# Patient Record
(demographics unavailable — no encounter records)

---

## 2025-02-18 NOTE — REASON FOR VISIT
[Home] : at home, [unfilled] , at the time of the visit. [Medical Office: ([facility])___] : at the medical office located in  [Telehealth (audio & video)] : This visit was provided via telehealth using real-time 2-way audio visual technology. [Initial Evaluation] : an initial evaluation [FreeTextEntry1] : Recurrent left inguinal hernia after a robot-assisted laparoscopic left inguinal hernia repair done in October 2024.  Patient appreciates radiation from left groin to left testicle.  Without any much relief.  When he sits with pants with a belt he is uncomfortable with that.  He is considering a second opinion here for a tissue repair with possible removal of mesh.  There is a reported recurrence based on an ultrasound and a CAT scan image.  He will share those with me.

## 2025-02-18 NOTE — PLAN
[FreeTextEntry1] : Recurrent left inguinal hernia based on imaging and expert consultation from his prior general surgeon.  Chronic left groin pain after robot-assisted laparoscopic left inguinal hernia repair October 2024.  Patient has been counseled on exploration of the left groin, explantation of left inguinal mesh, and open left inguinal hernia repair Shouldice technique.   Surgical options including watchful waiting have been discussed with patient at length.  I reported  that the data shows that watchful waiting may actually eventually convert over [id number] 80% of patients having a surgical intervention.  The best time for surgery is usually during a window of good health.  Surgical options include open, no mesh, with mesh, minimally invasive - meaning laparoscopic or robotic with mesh.  Detailed explanations and a discussion was undertaken with the patient.  Questions have been answered to their satisfaction. Risks, benefits and contraindications were discussed with him at length, including, but not exclusive to bleeding, infection, nerve/vessel injury, surgically transecting the cremasterics including the genital branch of the gentofemoral nerve  which runs in the cremasterics, recurrance rates, chronic pain, sutures used, heart attach, stroke, blood clots, more surgery, death Preoperative labs will be ordered, including cbc, cmp, ekg, in addition to clearance from primary and other specialists as needed.  Preparations and expectations reviewed for preop, perioperative and postoperative discussed in detail, including, pain management, showering, no swimming or bathing for 10 days, follow up in my office in 2-4 weeks, diet recommendations, activity recommendations and return to work.

## 2025-04-15 NOTE — PLAN
[FreeTextEntry1] : s/p open bilaterel inguial herina repair - repair structurally sound. revieewed op note, ansewrwed recovery questions, and ? removal of mesh on left side robotically if needed.

## 2025-04-15 NOTE — REASON FOR VISIT
[Post Op: _________] : a [unfilled] post op visit [FreeTextEntry1] : s/p open bilateral inguinal hernia repair about 2 weeks now for LIH recurrnace and pain, and RIH wiht femoral primary.  pain improving, + walking and light jog, + ejaculation.   Used mostly ice and NSAIDS